# Patient Record
Sex: MALE | Race: WHITE | NOT HISPANIC OR LATINO | Employment: OTHER | URBAN - METROPOLITAN AREA
[De-identification: names, ages, dates, MRNs, and addresses within clinical notes are randomized per-mention and may not be internally consistent; named-entity substitution may affect disease eponyms.]

---

## 2017-10-03 ENCOUNTER — OFFICE VISIT (OUTPATIENT)
Dept: UROLOGY | Facility: CLINIC | Age: 37
End: 2017-10-03
Payer: COMMERCIAL

## 2017-10-03 ENCOUNTER — TELEPHONE (OUTPATIENT)
Dept: UROLOGY | Facility: CLINIC | Age: 37
End: 2017-10-03

## 2017-10-03 VITALS
TEMPERATURE: 98 F | HEIGHT: 72 IN | HEART RATE: 82 BPM | WEIGHT: 269 LBS | SYSTOLIC BLOOD PRESSURE: 122 MMHG | BODY MASS INDEX: 36.44 KG/M2 | DIASTOLIC BLOOD PRESSURE: 86 MMHG

## 2017-10-03 DIAGNOSIS — N20.0 KIDNEY STONES: Primary | ICD-10-CM

## 2017-10-03 DIAGNOSIS — N20.0 NEPHROLITHIASIS: Primary | ICD-10-CM

## 2017-10-03 DIAGNOSIS — R39.9 LOWER URINARY TRACT SYMPTOMS: ICD-10-CM

## 2017-10-03 DIAGNOSIS — R35.1 NOCTURIA: ICD-10-CM

## 2017-10-03 LAB
BACTERIA #/AREA URNS AUTO: ABNORMAL /HPF
BILIRUB UR QL STRIP: NEGATIVE
CLARITY UR REFRACT.AUTO: CLEAR
COLOR UR AUTO: YELLOW
GLUCOSE UR QL STRIP: NEGATIVE
HGB UR QL STRIP: ABNORMAL
KETONES UR QL STRIP: NEGATIVE
LEUKOCYTE ESTERASE UR QL STRIP: NEGATIVE
MICROSCOPIC COMMENT: ABNORMAL
NITRITE UR QL STRIP: NEGATIVE
PH UR STRIP: 5 [PH] (ref 5–8)
PROT UR QL STRIP: NEGATIVE
RBC #/AREA URNS AUTO: 5 /HPF (ref 0–4)
SP GR UR STRIP: 1.02 (ref 1–1.03)
URN SPEC COLLECT METH UR: ABNORMAL
UROBILINOGEN UR STRIP-ACNC: NEGATIVE EU/DL
WBC #/AREA URNS AUTO: 4 /HPF (ref 0–5)

## 2017-10-03 PROCEDURE — 99999 PR PBB SHADOW E&M-NEW PATIENT-LVL III: CPT | Mod: PBBFAC,,, | Performed by: UROLOGY

## 2017-10-03 PROCEDURE — 87086 URINE CULTURE/COLONY COUNT: CPT

## 2017-10-03 PROCEDURE — 81001 URINALYSIS AUTO W/SCOPE: CPT

## 2017-10-03 PROCEDURE — 99204 OFFICE O/P NEW MOD 45 MIN: CPT | Mod: S$GLB,,, | Performed by: UROLOGY

## 2017-10-03 NOTE — PROGRESS NOTES
Subjective:      Patient ID: Bassam Smith is a 37 y.o. male.    Chief Complaint: Nephrolithiasis (bilateral) and Other (prostate mass)    Patient is a 37 y.o. male who is new to our clinic and referred by their PCP, Dr. Sanchez for evaluation of kidney stones.     HPI    Urolithiasis  Patient complains of right abdominal pain without radiation to the groin. Onset of symptoms was abrupt starting 1 month ago with controlled course since that time. Patient describes the pain as aching and cramping, intermittent, occurring about every 48  Hours. The patient has had no nausea/no vomiting and no diaphoresis. There has been no fever or chills. The patient is not complaining of dysuria or frequency. Risk factors for urolithiasis: none.      Also complains of occasional nocturia 3-4x/night.  Slight urinary frequency but not severe.  aua ss 14/2.  No prostate meds.    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  Has an IVC filter. Denies blood thinners.    No results found for: PSA, PSADIAG, PSATOTAL, PSAFREE      Review of Systems   All other systems reviewed and negative except pertinent positives noted in HPI.    Objective:     Physical Exam   Nursing note and vitals reviewed.  Constitutional: He is oriented to person, place, and time. Vital signs are normal. He appears well-developed and well-nourished. He is cooperative.   HENT:   Head: Normocephalic.   Neck: No tracheal deviation present.   Cardiovascular: Normal rate and intact distal pulses.    Pulmonary/Chest: Effort normal. No accessory muscle usage. No tachypnea. No respiratory distress.   Abdominal: Soft. Normal appearance. He exhibits no distension, no fluid wave, no ascites and no mass. There is no tenderness. There is no rebound and no CVA tenderness. No hernia. Hernia confirmed negative in the right inguinal area and confirmed negative in the left inguinal area.       Liver/spleen non-palpable.   Genitourinary: Prostate normal, testes normal and penis normal.  Rectal exam shows no external hemorrhoid, no mass, no tenderness and anal tone normal. Prostate is not tender. Right testis shows no mass and no tenderness. Right testis is descended. Left testis shows no mass and no tenderness. Left testis is descended. Uncircumcised.   Genitourinary Comments: Scrotum showed no rashes or lesions.  Anus/perineum without lesion.  Epididymis showed no masses or tenderness. No meatal stenosis, meatus in normal location. No penile discharge/plaques. Prostate smooth, no nodules, 20 grams.  Seminal vesicles non-palpable.  Normal sphincter tone.        Musculoskeletal: Normal range of motion.   Lymphadenopathy: No inguinal adenopathy noted on the right or left side.        Right: No inguinal adenopathy present.        Left: No inguinal adenopathy present.   Neurological: He is alert and oriented to person, place, and time. He has normal strength.   Skin: No bruising and no rash noted.     Psychiatric: He has a normal mood and affect. His speech is normal and behavior is normal. Thought content normal.     Assessment:     1. Kidney stones    2. Lower urinary tract symptoms    3. Nocturia      Plan:     1. Kidney stones:  -General risk factors for kidney stones and the conservative measures to prevent kidney stones in the future were discussed with the patient in detail.  The patient was encouraged to drink 2-3 liters of water a day, limit iced tea and jasiel as well as foods high in oxalate.  They were cautioned to try to limit salt and red meat intake.  We also discussed adding citrate to the diet with the addition of alize or lemon juice to their water or alternatively with crystal light.   -CT scan from [facility] was independently reviewed today and reveals right midpole/renal pelvis stone, 11 mm, skin to stone distance 11 cm, Hounsfield units 403.  Right lower pole stone approximately 6 mm, Hounsfield units 688, skin to stone distance 11 cm.    -I have explained the  indication, risks, benefits, and alternatives of the procedure in detail.  Risks including but not limited to bleeding, infection, injury to the urethra, bladder, ureter, need for additional treatments, inability or incomplete removal of kidney stones, pain, and discomfort related to the stent were discussed in depth with the patient.  The patient voices understanding and all questions have been answered.  The patient agrees to proceed as planned with right ureteroscopy, laser lithotripsy, and ureteral stent placement.        2. LUTs:   -mild, patient satisfied      3. Nocturia:  -Limit fluids 2 hours before bedtime.  Elevated legs 2 hours before bedtime.  No caffeine, cokes, teas after 3 pm in the afternoon.

## 2017-10-03 NOTE — LETTER
October 3, 2017      [place]  [address] [address] [place] [address]           [facility]  [address]  [place] [address]  Phone: [phone]          Patient: Bassam Smith   MR Number: [id number]   YOB: 1980   Date of Visit: 10/3/2017       Dear Kristen Sanchez:    Thank you for referring Bassam Smith to me for evaluation. Attached you will find relevant portions of my assessment and plan of care.    If you have questions, please do not hesitate to call me. I look forward to following Bassam Smith along with you.    Sincerely,    Chase Sandoval MD    Enclosure  CC:  No Recipients    If you would like to receive this communication electronically, please contact [email or web address] or [phone] to request more information on [employer] access.    For providers and/or their staff who would like to refer a patient to [facility], please contact us through our one-stop-shop provider referral line, [facility], at [phone].    If you feel you have received this communication in error or would no longer like to receive these types of communications, please e-mail [email or web address]

## 2017-10-04 DIAGNOSIS — Z01.818 PREOP TESTING: Primary | ICD-10-CM

## 2017-10-04 LAB — BACTERIA UR CULT: NORMAL

## 2017-10-05 ENCOUNTER — TELEPHONE (OUTPATIENT)
Dept: UROLOGY | Facility: CLINIC | Age: 37
End: 2017-10-05

## 2020-08-29 ENCOUNTER — HOSPITAL ENCOUNTER (OUTPATIENT)
Facility: HOSPITAL | Age: 40
Discharge: HOME | End: 2020-08-30
Attending: SURGERY | Admitting: SURGERY
Payer: COMMERCIAL

## 2020-08-29 ENCOUNTER — HOSPITAL ENCOUNTER (EMERGENCY)
Facility: HOSPITAL | Age: 40
Discharge: HOME | End: 2020-08-29
Attending: EMERGENCY MEDICINE
Payer: COMMERCIAL

## 2020-08-29 VITALS
DIASTOLIC BLOOD PRESSURE: 75 MMHG | TEMPERATURE: 97 F | HEIGHT: 68 IN | WEIGHT: 268.31 LBS | HEART RATE: 76 BPM | BODY MASS INDEX: 40.66 KG/M2 | RESPIRATION RATE: 16 BRPM | SYSTOLIC BLOOD PRESSURE: 142 MMHG | OXYGEN SATURATION: 99 %

## 2020-08-29 DIAGNOSIS — S30.1XXA ABDOMINAL WALL HEMATOMA, INITIAL ENCOUNTER: Primary | ICD-10-CM

## 2020-08-29 DIAGNOSIS — R10.9 ABDOMINAL PAIN: ICD-10-CM

## 2020-08-29 LAB
ALBUMIN SERPL BCP-MCNC: 4.1 G/DL (ref 3.5–5.2)
ALP SERPL-CCNC: 78 U/L (ref 55–135)
ALT SERPL W/O P-5'-P-CCNC: 51 U/L (ref 10–44)
ANION GAP SERPL CALC-SCNC: 10 MMOL/L (ref 8–16)
AST SERPL-CCNC: 26 U/L (ref 10–40)
BASOPHILS # BLD AUTO: 0.02 K/UL (ref 0–0.2)
BASOPHILS NFR BLD: 0.4 % (ref 0–1.9)
BILIRUB SERPL-MCNC: 0.7 MG/DL (ref 0.1–1)
BILIRUB UR QL STRIP: NEGATIVE
BUN SERPL-MCNC: 12 MG/DL (ref 6–20)
CALCIUM SERPL-MCNC: 9.3 MG/DL (ref 8.7–10.5)
CHLORIDE SERPL-SCNC: 105 MMOL/L (ref 95–110)
CK MB SERPL-MCNC: 1.2 NG/ML (ref 0.1–6.5)
CK MB SERPL-RTO: 1.1 % (ref 0–5)
CK SERPL-CCNC: 114 U/L (ref 20–200)
CK SERPL-CCNC: 114 U/L (ref 20–200)
CLARITY UR: CLEAR
CO2 SERPL-SCNC: 26 MMOL/L (ref 23–29)
COLOR UR: YELLOW
CREAT SERPL-MCNC: 1.1 MG/DL (ref 0.5–1.4)
DIFFERENTIAL METHOD: ABNORMAL
EOSINOPHIL # BLD AUTO: 0.1 K/UL (ref 0–0.5)
EOSINOPHIL NFR BLD: 0.9 % (ref 0–8)
ERYTHROCYTE [DISTWIDTH] IN BLOOD BY AUTOMATED COUNT: 13.5 % (ref 11.5–14.5)
EST. GFR  (AFRICAN AMERICAN): >60 ML/MIN/1.73 M^2
EST. GFR  (NON AFRICAN AMERICAN): >60 ML/MIN/1.73 M^2
GLUCOSE SERPL-MCNC: 107 MG/DL (ref 70–110)
GLUCOSE UR QL STRIP: NEGATIVE
HCT VFR BLD AUTO: 50.7 % (ref 40–54)
HGB BLD-MCNC: 17 G/DL (ref 14–18)
HGB UR QL STRIP: ABNORMAL
IMM GRANULOCYTES # BLD AUTO: 0.02 K/UL (ref 0–0.04)
IMM GRANULOCYTES NFR BLD AUTO: 0.4 % (ref 0–0.5)
KETONES UR QL STRIP: NEGATIVE
LEUKOCYTE ESTERASE UR QL STRIP: NEGATIVE
LIPASE SERPL-CCNC: 18 U/L (ref 4–60)
LYMPHOCYTES # BLD AUTO: 1.1 K/UL (ref 1–4.8)
LYMPHOCYTES NFR BLD: 19.4 % (ref 18–48)
MCH RBC QN AUTO: 29.4 PG (ref 27–31)
MCHC RBC AUTO-ENTMCNC: 33.5 G/DL (ref 32–36)
MCV RBC AUTO: 88 FL (ref 82–98)
MONOCYTES # BLD AUTO: 0.4 K/UL (ref 0.3–1)
MONOCYTES NFR BLD: 7.4 % (ref 4–15)
NEUTROPHILS # BLD AUTO: 3.9 K/UL (ref 1.8–7.7)
NEUTROPHILS NFR BLD: 71.5 % (ref 38–73)
NITRITE UR QL STRIP: NEGATIVE
NRBC BLD-RTO: 0 /100 WBC
PH UR STRIP: 6 [PH] (ref 5–8)
PLATELET # BLD AUTO: 141 K/UL (ref 150–350)
PMV BLD AUTO: 9.8 FL (ref 9.2–12.9)
POTASSIUM SERPL-SCNC: 4.1 MMOL/L (ref 3.5–5.1)
PROT SERPL-MCNC: 7.2 G/DL (ref 6–8.4)
PROT UR QL STRIP: NEGATIVE
RBC # BLD AUTO: 5.79 M/UL (ref 4.6–6.2)
SARS-COV-2 RDRP RESP QL NAA+PROBE: NEGATIVE
SODIUM SERPL-SCNC: 141 MMOL/L (ref 136–145)
SP GR UR STRIP: 1.02 (ref 1–1.03)
TROPONIN I SERPL DL<=0.01 NG/ML-MCNC: <0.006 NG/ML (ref 0–0.03)
URN SPEC COLLECT METH UR: ABNORMAL
UROBILINOGEN UR STRIP-ACNC: NEGATIVE EU/DL
WBC # BLD AUTO: 5.42 K/UL (ref 3.9–12.7)

## 2020-08-29 PROCEDURE — 81003 URINALYSIS AUTO W/O SCOPE: CPT

## 2020-08-29 PROCEDURE — 93010 ELECTROCARDIOGRAM REPORT: CPT | Mod: ,,, | Performed by: INTERNAL MEDICINE

## 2020-08-29 PROCEDURE — G0378 HOSPITAL OBSERVATION PER HR: HCPCS

## 2020-08-29 PROCEDURE — 25500020 PHARM REV CODE 255: Performed by: SURGERY

## 2020-08-29 PROCEDURE — 99285 EMERGENCY DEPT VISIT HI MDM: CPT | Mod: 25,27

## 2020-08-29 PROCEDURE — 25000003 PHARM REV CODE 250: Performed by: SURGERY

## 2020-08-29 PROCEDURE — 93010 EKG 12-LEAD: ICD-10-PCS | Mod: ,,, | Performed by: INTERNAL MEDICINE

## 2020-08-29 PROCEDURE — 84484 ASSAY OF TROPONIN QUANT: CPT

## 2020-08-29 PROCEDURE — 80053 COMPREHEN METABOLIC PANEL: CPT

## 2020-08-29 PROCEDURE — 82553 CREATINE MB FRACTION: CPT

## 2020-08-29 PROCEDURE — 82550 ASSAY OF CK (CPK): CPT

## 2020-08-29 PROCEDURE — 85025 COMPLETE CBC W/AUTO DIFF WBC: CPT

## 2020-08-29 PROCEDURE — U0002 COVID-19 LAB TEST NON-CDC: HCPCS

## 2020-08-29 PROCEDURE — 93005 ELECTROCARDIOGRAM TRACING: CPT

## 2020-08-29 PROCEDURE — 99284 EMERGENCY DEPT VISIT MOD MDM: CPT | Mod: 25

## 2020-08-29 PROCEDURE — 83690 ASSAY OF LIPASE: CPT

## 2020-08-29 RX ORDER — ONDANSETRON 2 MG/ML
4 INJECTION INTRAMUSCULAR; INTRAVENOUS
Status: DISCONTINUED | OUTPATIENT
Start: 2020-08-29 | End: 2020-08-29

## 2020-08-29 RX ORDER — ONDANSETRON 2 MG/ML
4 INJECTION INTRAMUSCULAR; INTRAVENOUS
Status: ACTIVE | OUTPATIENT
Start: 2020-08-29 | End: 2020-08-30

## 2020-08-29 RX ORDER — HYDROCODONE BITARTRATE AND ACETAMINOPHEN 5; 325 MG/1; MG/1
1 TABLET ORAL EVERY 4 HOURS PRN
Status: DISCONTINUED | OUTPATIENT
Start: 2020-08-29 | End: 2020-08-30 | Stop reason: HOSPADM

## 2020-08-29 RX ORDER — ONDANSETRON 2 MG/ML
4 INJECTION INTRAMUSCULAR; INTRAVENOUS EVERY 8 HOURS PRN
Status: DISCONTINUED | OUTPATIENT
Start: 2020-08-29 | End: 2020-08-30 | Stop reason: HOSPADM

## 2020-08-29 RX ORDER — MORPHINE SULFATE 2 MG/ML
2 INJECTION, SOLUTION INTRAMUSCULAR; INTRAVENOUS EVERY 4 HOURS PRN
Status: DISCONTINUED | OUTPATIENT
Start: 2020-08-29 | End: 2020-08-30 | Stop reason: HOSPADM

## 2020-08-29 RX ORDER — ACETAMINOPHEN 325 MG/1
650 TABLET ORAL EVERY 8 HOURS PRN
Status: DISCONTINUED | OUTPATIENT
Start: 2020-08-29 | End: 2020-08-30 | Stop reason: HOSPADM

## 2020-08-29 RX ORDER — SODIUM CHLORIDE 9 MG/ML
INJECTION, SOLUTION INTRAVENOUS CONTINUOUS
Status: DISCONTINUED | OUTPATIENT
Start: 2020-08-29 | End: 2020-08-30 | Stop reason: HOSPADM

## 2020-08-29 RX ORDER — SODIUM CHLORIDE 0.9 % (FLUSH) 0.9 %
10 SYRINGE (ML) INJECTION
Status: DISCONTINUED | OUTPATIENT
Start: 2020-08-29 | End: 2020-08-30 | Stop reason: HOSPADM

## 2020-08-29 RX ORDER — MORPHINE SULFATE 2 MG/ML
2 INJECTION, SOLUTION INTRAMUSCULAR; INTRAVENOUS
Status: ACTIVE | OUTPATIENT
Start: 2020-08-29 | End: 2020-08-30

## 2020-08-29 RX ORDER — TRAMADOL HYDROCHLORIDE 50 MG/1
50 TABLET ORAL EVERY 6 HOURS PRN
Qty: 12 TABLET | Refills: 0 | Status: SHIPPED | OUTPATIENT
Start: 2020-08-29 | End: 2021-10-06

## 2020-08-29 RX ORDER — MORPHINE SULFATE 2 MG/ML
1 INJECTION, SOLUTION INTRAMUSCULAR; INTRAVENOUS
Status: DISCONTINUED | OUTPATIENT
Start: 2020-08-29 | End: 2020-08-29

## 2020-08-29 RX ADMIN — SODIUM CHLORIDE 500 ML: 0.9 INJECTION, SOLUTION INTRAVENOUS at 09:08

## 2020-08-29 RX ADMIN — SODIUM CHLORIDE: 0.9 INJECTION, SOLUTION INTRAVENOUS at 10:08

## 2020-08-29 RX ADMIN — SODIUM CHLORIDE 500 ML: 0.9 INJECTION, SOLUTION INTRAVENOUS at 12:08

## 2020-08-29 RX ADMIN — IOHEXOL 100 ML: 350 INJECTION, SOLUTION INTRAVENOUS at 01:08

## 2020-08-29 NOTE — ED TRIAGE NOTES
40 y.o. male presents to ER ED 04/ED 04   Chief Complaint   Patient presents with    Fall     hit himself with the arm of a  the groin and is hurting from the groin to the lower abdomen, pt states he fell off of a  2 foot stand 1 hour ago, he denies LOC   . No acute distress noted.

## 2020-08-29 NOTE — ED PROVIDER NOTES
Encounter Date: 8/29/2020       History     Chief Complaint   Patient presents with    Fall     hit himself with the arm of a  the groin and is hurting from the groin to the lower abdomen, pt states he fell off of a  2 foot stand 1 hour ago, he denies LOC     Bassam Smith is a 40 y.o. male with PMH of nephrolithiasis who presents to the ED for evaluation of lower abdominal/groin pain after accidentally hitting himself with the arm of a  the lower abdomen/ groin and causing him to fall approximately 2 ft.   He presents with pain to lower abdomen, mons pubis with associated bruising and swelling.  He denies difficulty urinating or hematuria.   Denies Loc with fall; he denies head, neck or back pain.   Denies dizziness, light-headedness, nausea or vomiting.     The history is provided by the patient.     Review of patient's allergies indicates:  No Known Allergies  Past Medical History:   Diagnosis Date    Nephrolithiasis      Past Surgical History:   Procedure Laterality Date    CHOLECYSTECTOMY       History reviewed. No pertinent family history.  Social History     Tobacco Use    Smoking status: Never Smoker   Substance Use Topics    Alcohol use: No    Drug use: Not on file     Review of Systems   Constitutional: Negative.  Negative for appetite change, chills and fever.   HENT: Negative.  Negative for congestion, ear discharge, ear pain, postnasal drip, rhinorrhea and sore throat.    Eyes: Negative.    Respiratory: Negative.  Negative for cough, chest tightness and shortness of breath.    Cardiovascular: Negative.  Negative for chest pain.   Gastrointestinal: Positive for abdominal pain. Negative for abdominal distention and nausea.   Endocrine: Negative.    Genitourinary: Negative.  Negative for dysuria, flank pain, hematuria and urgency.   Musculoskeletal: Negative.  Negative for arthralgias and back pain.   Skin: Negative.  Negative for rash.   Allergic/Immunologic: Negative.     Neurological: Negative.  Negative for dizziness, weakness, numbness and headaches.   Hematological: Negative.  Does not bruise/bleed easily.   Psychiatric/Behavioral: Negative.        Physical Exam     Initial Vitals [08/29/20 1151]   BP Pulse Resp Temp SpO2   (!) 157/99 83 20 97 °F (36.1 °C) 99 %      MAP       --         Physical Exam    Nursing note and vitals reviewed.  Constitutional: He appears well-developed and well-nourished.   HENT:   Head: Normocephalic and atraumatic.   Right Ear: External ear normal.   Left Ear: External ear normal.   Nose: Nose normal.   Mouth/Throat: Oropharynx is clear and moist. No oropharyngeal exudate.   Eyes: Conjunctivae and EOM are normal. Pupils are equal, round, and reactive to light.   Neck: Normal range of motion. Neck supple.   Cardiovascular: Normal rate, regular rhythm, normal heart sounds and intact distal pulses.   Pulmonary/Chest: Breath sounds normal.   Abdominal: Soft. Bowel sounds are normal. There is abdominal tenderness in the suprapubic area. There is no rigidity, no rebound, no guarding, no CVA tenderness, no tenderness at McBurney's point and negative Carolina's sign. No hernia.   Musculoskeletal: Normal range of motion.   Neurological: He is alert and oriented to person, place, and time. He has normal strength.   Skin: Skin is warm and dry. Bruising (mons pubis) and ecchymosis noted.   Psychiatric: He has a normal mood and affect. His behavior is normal. Judgment and thought content normal.         ED Course   Procedures  Labs Reviewed   URINALYSIS - Abnormal; Notable for the following components:       Result Value    Occult Blood UA Trace (*)     All other components within normal limits   COMPREHENSIVE METABOLIC PANEL - Abnormal; Notable for the following components:    ALT 51 (*)     All other components within normal limits   CBC W/ AUTO DIFFERENTIAL - Abnormal; Notable for the following components:    Platelets 141 (*)     All other components within  normal limits   TROPONIN I   CK   CK-MB   LIPASE          Imaging Results          CT Abdomen Pelvis With Contrast (Final result)  Result time 08/29/20 14:02:49    Final result by Bassam Shaikh MD (08/29/20 14:02:49)                 Impression:      1. No evidence of acute intra-abdominal/intrapelvic traumatic injury.  2. Mild posttraumatic edema and subcutaneous fat stranding/hemorrhage involving the lower ventral abdominal and pelvic wall.  Note is made of a thin hematoma in the region of the right inferior epigastric artery.  Continued clinical surveillance for hematoma enlargement recommended.  3. Enhancing 3.2 cm splenic mass, slightly enlarged as compared to the prior reference CT on 09/06/2017, which is indeterminate and may represent a hemangioma.  Further characterization with follow-up nonemergent MRI of the liver recommended.  4. Bilateral nonobstructive nephrolithiasis with a nonobstructive, 2 mm stone within the distal left ureter.  No hydronephrosis.  5. Hepatic steatosis.  6. Additional findings, as above.  Yellow Splenic/Azra 2013 Alert:    Yellow Actionable Finding Splenic or Azra on CT or MRI (J Am Erin Radiol 2013;10:833-839)    UNEXPECTED FINDINGS:  Incidental Splenic Mass - Non Diagnostic Features Not Stable (2, 3 or 4)    RECOMMENDATIONS:  MRI Liver/Spleen with and without contrast      Electronically signed by: Bassam Shaikh  Date:    08/29/2020  Time:    14:02             Narrative:    EXAMINATION:  CT ABDOMEN PELVIS WITH CONTRAST    CLINICAL HISTORY:  Abdominal trauma, blunt;Abdominal distension;    TECHNIQUE:  Low dose axial images, sagittal and coronal reformations were obtained from the lung bases to the pubic symphysis following the IV administration of 100 mL of Omnipaque 350 .  Oral contrast was not administered.    COMPARISON:  Reference CT abdomen and pelvis with contrast 09/06/2017, which is slightly CT abdomen pelvis 09/21/2014    FINDINGS:  Abdomen:    - Lower thorax:Heart is  normal in size.  No pericardial effusion.    - Lung bases: No infiltrates and no nodules.    - Liver: Liver is normal in size and contour.  Hepatic parenchyma is diffusely decreased in attenuation in keeping with hepatic steatosis.  No focal hepatic mass.    - Gallbladder: Gallbladder surgically absent.    - Bile Ducts: No evidence of intra or extra hepatic biliary ductal dilation.    - Stomach: No significant abnormalities.    - Spleen: There is a heterogeneous enhancing mass within the spleen, measuring 2.9 x 3.2 cm in AP by TV dimensions, slightly increased in size as compared to the prior reference CT on 09/06/2017.  Moderate enlargement as compared to the prior CT dated 09/21/2014.    - Kidneys: Kidneys are normal in size and enhance appropriately. No enhancing mass or hydronephrosis. Bilateral nonobstructive nephrolithiasis.  There is a 2 mm nonobstructive stone within the distal left ureter.  No hydronephrosis.    - Adrenals: Adrenal glands appear within normal limits.    - Pancreas: No mass or peripancreatic fat stranding.    - Retroperitoneum:  No significant adenopathy.    - Vascular: No abdominal aortic aneurysm.  IVC filter in place.    - Abdominal wall:  Soft tissue swelling and subcutaneous fat stranding of the lower ventral abdominal and pelvic wall, likely posttraumatic.  Note is made of a thin hyperdense collection adjacent to the right inferior epigastric artery, measuring approximately 0.4 x 1.3 cm, suggestive of a small hematoma.    Pelvis:    No pelvic mass, adenopathy, or free fluid.    Bowel/Mesentery:    Diverticulosis coli without evidence of diverticulitis.  No evidence of bowel obstruction or inflammation.  No ascites or pneumoperitoneum.  The appendix appears normal.    Bones: Age-appropriate degenerative changes.  Bilateral L5 pars defects with minimal grade 1 anterolisthesis of L5 on S1.  No acute osseous abnormality and no suspicious lytic or blastic lesion.                                   Medications   sodium chloride 0.9% bolus 500 mL (0 mLs Intravenous Stopped 8/29/20 1308)   iohexoL (OMNIPAQUE 350) injection 100 mL (100 mLs Intravenous Given 8/29/20 1323)     Medical Decision Making:   Clinical Tests:   Lab Tests: Ordered and Reviewed  Radiological Study: Ordered and Reviewed  ED Management:  Ice pack applied to lower abdomen/mons pubis  Lab gomez unremarkable.  CT abdomen: 1. No evidence of acute intra-abdominal/intrapelvic traumatic injury.  2. Mild posttraumatic edema and subcutaneous fat stranding/hemorrhage involving the lower ventral abdominal and pelvic wall.  Note is made of a thin hematoma in the region of the right inferior epigastric artery.  Continued clinical surveillance for hematoma enlargement recommended.  3. Enhancing 3.2 cm splenic mass, slightly enlarged as compared to the prior reference CT on 09/06/2017, which is indeterminate and may represent a hemangioma.  Further characterization with follow-up nonemergent MRI of the liver recommended.  4. Bilateral nonobstructive nephrolithiasis with a nonobstructive, 2 mm stone within the distal left ureter.  No hydronephrosis.  5. Hepatic steatosis.  6. Additional findings, as above.  Yellow Splenic/Azra 2013 Alert:     Yellow Actionable Finding Splenic or Azra on CT or MRI (J Am Erin Radiol 2013;10:833-839)     UNEXPECTED FINDINGS:  Incidental Splenic Mass - Non Diagnostic Features Not Stable (2, 3 or 4)     RECOMMENDATIONS:  MRI Liver/Spleen with and without contrast        Ct reviewed with collaborating provider, Dr. Shay.   Ice pack for comfort; ultram for pain; close follow-up with medical provider with specific return precautions discussed with voiced understanding. Patient will follow-up with KAVYA Cardoso.                                        Clinical Impression:       ICD-10-CM ICD-9-CM   1. Abdominal wall hematoma, initial encounter  S30.1XXA 922.2   2. Abdominal pain  R10.9 789.00             ED Disposition  Condition    Discharge Stable        ED Prescriptions     Medication Sig Dispense Start Date End Date Auth. Provider    traMADoL (ULTRAM) 50 mg tablet Take 1 tablet (50 mg total) by mouth every 6 (six) hours as needed for Pain. 12 tablet 8/29/2020  Dorothy Mckeon NP        Follow-up Information     Follow up With Specialties Details Why Contact Info    Kristen Sanchez NP Family Medicine Go in 2 days  [id number]   [place] [address] [phone]                      The patient acknowledges that close follow up with medical provider is required. Instructed to follow up with PCP within 2 days. Patient was given specific return precautions. The patient agrees to comply with all instruction and directions given in the ER.                  Dorotyh Mckeon NP  08/29/20 4169

## 2020-08-30 VITALS
BODY MASS INDEX: 36.43 KG/M2 | RESPIRATION RATE: 18 BRPM | OXYGEN SATURATION: 95 % | DIASTOLIC BLOOD PRESSURE: 73 MMHG | WEIGHT: 268.94 LBS | TEMPERATURE: 98 F | HEIGHT: 72 IN | SYSTOLIC BLOOD PRESSURE: 118 MMHG | HEART RATE: 78 BPM

## 2020-08-30 LAB
ALBUMIN SERPL BCP-MCNC: 3.7 G/DL (ref 3.5–5.2)
ALP SERPL-CCNC: 68 U/L (ref 55–135)
ALT SERPL W/O P-5'-P-CCNC: 43 U/L (ref 10–44)
ANION GAP SERPL CALC-SCNC: 12 MMOL/L (ref 8–16)
AST SERPL-CCNC: 22 U/L (ref 10–40)
BASOPHILS # BLD AUTO: 0.03 K/UL (ref 0–0.2)
BASOPHILS NFR BLD: 0.5 % (ref 0–1.9)
BILIRUB SERPL-MCNC: 0.5 MG/DL (ref 0.1–1)
BUN SERPL-MCNC: 10 MG/DL (ref 6–20)
CALCIUM SERPL-MCNC: 8.7 MG/DL (ref 8.7–10.5)
CHLORIDE SERPL-SCNC: 105 MMOL/L (ref 95–110)
CO2 SERPL-SCNC: 25 MMOL/L (ref 23–29)
CREAT SERPL-MCNC: 0.9 MG/DL (ref 0.5–1.4)
DIFFERENTIAL METHOD: ABNORMAL
EOSINOPHIL # BLD AUTO: 0.1 K/UL (ref 0–0.5)
EOSINOPHIL NFR BLD: 1.1 % (ref 0–8)
ERYTHROCYTE [DISTWIDTH] IN BLOOD BY AUTOMATED COUNT: 13.5 % (ref 11.5–14.5)
EST. GFR  (AFRICAN AMERICAN): >60 ML/MIN/1.73 M^2
EST. GFR  (NON AFRICAN AMERICAN): >60 ML/MIN/1.73 M^2
GLUCOSE SERPL-MCNC: 97 MG/DL (ref 70–110)
HCT VFR BLD AUTO: 47.2 % (ref 40–54)
HGB BLD-MCNC: 15.6 G/DL (ref 14–18)
IMM GRANULOCYTES # BLD AUTO: 0.02 K/UL (ref 0–0.04)
IMM GRANULOCYTES NFR BLD AUTO: 0.4 % (ref 0–0.5)
LYMPHOCYTES # BLD AUTO: 1.4 K/UL (ref 1–4.8)
LYMPHOCYTES NFR BLD: 25.1 % (ref 18–48)
MCH RBC QN AUTO: 29.4 PG (ref 27–31)
MCHC RBC AUTO-ENTMCNC: 33.1 G/DL (ref 32–36)
MCV RBC AUTO: 89 FL (ref 82–98)
MONOCYTES # BLD AUTO: 0.5 K/UL (ref 0.3–1)
MONOCYTES NFR BLD: 8.7 % (ref 4–15)
NEUTROPHILS # BLD AUTO: 3.5 K/UL (ref 1.8–7.7)
NEUTROPHILS NFR BLD: 64.2 % (ref 38–73)
NRBC BLD-RTO: 0 /100 WBC
PLATELET # BLD AUTO: 129 K/UL (ref 150–350)
PMV BLD AUTO: 10.1 FL (ref 9.2–12.9)
POTASSIUM SERPL-SCNC: 4.1 MMOL/L (ref 3.5–5.1)
PROT SERPL-MCNC: 6.4 G/DL (ref 6–8.4)
RBC # BLD AUTO: 5.3 M/UL (ref 4.6–6.2)
SODIUM SERPL-SCNC: 142 MMOL/L (ref 136–145)
WBC # BLD AUTO: 5.5 K/UL (ref 3.9–12.7)

## 2020-08-30 PROCEDURE — 99284 EMERGENCY DEPT VISIT MOD MDM: CPT | Mod: ,,, | Performed by: SURGERY

## 2020-08-30 PROCEDURE — 80053 COMPREHEN METABOLIC PANEL: CPT

## 2020-08-30 PROCEDURE — 36415 COLL VENOUS BLD VENIPUNCTURE: CPT

## 2020-08-30 PROCEDURE — 85025 COMPLETE CBC W/AUTO DIFF WBC: CPT

## 2020-08-30 PROCEDURE — 99284 PR EMERGENCY DEPT VISIT,LEVEL IV: ICD-10-PCS | Mod: ,,, | Performed by: SURGERY

## 2020-08-30 PROCEDURE — G0378 HOSPITAL OBSERVATION PER HR: HCPCS

## 2020-08-30 RX ORDER — ACETAMINOPHEN 325 MG/1
650 TABLET ORAL EVERY 6 HOURS PRN
Status: DISCONTINUED | OUTPATIENT
Start: 2020-08-30 | End: 2020-08-30 | Stop reason: HOSPADM

## 2020-08-30 RX ORDER — SODIUM CHLORIDE 9 MG/ML
INJECTION, SOLUTION INTRAVENOUS CONTINUOUS
Status: DISCONTINUED | OUTPATIENT
Start: 2020-08-30 | End: 2020-08-30 | Stop reason: HOSPADM

## 2020-08-30 NOTE — PLAN OF CARE
08/30/20 1320   Final Note   Assessment Type Final Discharge Note   Anticipated Discharge Disposition Home   What phone number can be called within the next 1-3 days to see how you are doing after discharge? [phone]   Hospital Follow Up  Appt(s) scheduled? Yes   Discharge plans and expectations educations in teach back method with documentation complete? Yes   Right Care Referral Info   Post Acute Recommendation No Care   Post-Acute Status   Post-Acute Authorization Other   Other Status No Post-Acute Service Needs   Discharge Delays None known at this time

## 2020-08-30 NOTE — ED PROVIDER NOTES
[facility]                                                 Chief Complaint  40 y.o. male with Abdominal Pain    History of Present Illness  Bassam Smith presents to the emergency room with abdominal wall hematoma  Patient was seen in the ER earlier today with a lower abdominal hematoma  Noted that time to have a hematoma involving the right inferior epigastric artery  Patient states he went home with minimal pain at the time of ER discharge today  Patient returns tonight with increased pain in the right lower abdominal wall area  He states now the hematoma involves the top of his glans penis, stable vitals    The history is provided by the patient   device was not used during this ER visit  Medical history: Nephrolithiasis  Surgeries: Gallbladder  No known allergies    I have reviewed all of this patient's past medical, surgical, family, and social   histories as well as active allergies and medications documented in the  electronic medical record    Review of Systems and Physical Exam      Review of Systems  -- Constitution - no fever, denies fatigue, no weakness, no chills  -- Eyes - no tearing or redness, no visual disturbance  -- Ear, Nose - no tinnitus or earache, no nasal congestion or discharge  -- Mouth,Throat - no sore throat, no toothache, normal voice, normal swallowing  -- Respiratory - denies cough and congestion, no shortness of breath, no MITCHELL  -- Cardiovascular - denies chest pain, no palpitations, denies claudication  -- Gastrointestinal - lower abdominal pain, no nausea, vomiting, or diarrhea  -- Genitourinary - no dysuria, denies flank pain, no hematuria, no STD risk  -- Musculoskeletal - denies back pain, negative for trauma or injury  -- Neurological - no headache, denies weakness or seizure; no LOC  -- Skin - denies pallor, rash, or changes in skin. no hives or welts noted  -- Psychiatric - Denies SI or HI, no psychosis or fractured thought noted      Physical Exam  -- Nursing note and vitals reviewed  -- Constitutional: Appears well-developed and well-nourished  -- Head: Atraumatic. Normocephalic. No obvious abnormality  -- Eyes: Pupils are equal and reactive to light. Normal conjunctiva and lids  -- Cardiac: Normal rate, regular rhythm and normal heart sounds  -- Pulmonary: Normal respiratory effort, breath sounds clear to auscultation  -- Abdominal: Soft, no tenderness. Normal bowel sounds. Normal liver edge  -- Musculoskeletal: Normal range of motion, no effusions. Joints stable   -- Neurological: No focal deficits. Showed good interaction with staff  -- Vascular: Posterior tibial, dorsalis pedis and radial pulses 2+ bilaterally    -- Lymphatics: No cervical or peripheral lymphadenopathy. No edema noted  -- Skin: lower abdominal wall hematoma with pain on exam    Emergency Room Course      Lab Results     K 4.1      CO2 26   BUN 12   CREATININE 1.1      ALKPHOS 78   AST 26   ALT 51 (H)   BILITOT 0.7   ALBUMIN 4.1   PROT 7.2   WBC 5.42   HGB 17.0   HCT 50.7    (L)         CPKMB 1.2   TROPONINI <0.006     Urinalysis  -- Urinalysis performed during this ER visit showed no signs of infection      EKG  -- The EKG findings today were without concerning findings from baseline     Radiology  No evidence of acute intra-abdominal/intrapelvic traumatic injury.  Mild posttraumatic edema and subcutaneous fat stranding/hemorrhage   involving the lower ventral abdominal and pelvic wall.    Note is made of a thin hematoma in the region of the right inferior epigastric artery.      Additional Work up  -- rapid Coronavirus PCR was negative    Medications Given  morphine injection 2 mg (has no administration in time range)   ondansetron injection 4 mg (has no administration in time range)   sodium chloride 0.9% bolus 500 mL (has no administration in time range)     ED Physician Management  -- Diagnosis management comments: 40 y.o.  male with abdominal wall hematoma  -- patient has a hematoma with right inferior epigastric artery found earlier today  -- patient returns tonight with increased abdominal pain, worsening hematoma  -- will be observed overnight under the care of General surgery for pain control    Diagnosis  [S30.1XXA] Abdominal wall hematoma, initial encounter (Primary)    Disposition and Plan  -- Disposition: observation  -- Condition: stable    This note is dictated on M*Modal word recognition program.  There are word recognition mistakes that are occasionally missed on review.              Bassam Shay MD  08/30/20 0980

## 2020-08-30 NOTE — NURSING
Patient awake, alert, oriented. Room air. Telemetry monitor removed. Peripheral IV removed. Catheter tip intact. Patient denies pain. Patient urinating without difficulty. VSS. AVS given and explained. Dr. Shay at bedside to educate patient. Verbalized understanding.

## 2020-08-30 NOTE — PLAN OF CARE
Problem: Adult Inpatient Plan of Care  Goal: Plan of Care Review  Outcome: Ongoing, Progressing     Problem: Pain Acute  Goal: Optimal Pain Control  Outcome: Ongoing, Progressing     Problem: Bariatric Environmental Safety  Goal: Safety Maintained with Care  Outcome: Ongoing, Progressing

## 2020-08-30 NOTE — NURSING TRANSFER
Nursing Transfer Note      8/29/2020    Transfer To: 307 from ED    Transfer via stretcher    Transfer with cardiac monitoring    Transported by nurse    Medicines sent: none    Chart send with patient: Yes    Notified: daughter    Patient reassessed at: 08/29/2020 @ 2220    Upon arrival to floor: cardiac monitor applied, patient oriented to room, call bell in reach and bed in lowest position. Ice applied to scrotal area.

## 2020-08-30 NOTE — H&P
[facility]  History & Physical    Subjective:      Chief Complaint/Reason for Admission:   Abdominal wall hematoma    Bassam Smith is a 40 y.o. male who presented to the emergency room after a fall.  Patient states he fell about 2 or 3 ft off of a stand and landed on a metal bar across his lower pelvis.  He presented to the emergency room and underwent evaluation after the incident.  Patient was complaining of a lot of suprapubic pain and noticed to have some ecchymosis and hematoma.  CT scan was unremarkable other than for a soft tissue hematoma.  Patient was discharged home and presented back to the emergency room due to increased pain and swelling.  Patient denies any abdominal pain.  He denies any nausea or vomiting.  He denies any dysuria.  He denies any hematuria.  Patient states he had no difficulty urinating and urine was clear.    Past Medical History:   Diagnosis Date    Nephrolithiasis      Past Surgical History:   Procedure Laterality Date    APPENDECTOMY      CHOLECYSTECTOMY       History reviewed. No pertinent family history.  Social History     Tobacco Use    Smoking status: Never Smoker   Substance Use Topics    Alcohol use: No    Drug use: Not on file       PTA Medications   Medication Sig    traMADoL (ULTRAM) 50 mg tablet Take 1 tablet (50 mg total) by mouth every 6 (six) hours as needed for Pain.     Review of patient's allergies indicates:  No Known Allergies     ROS  Denies headaches denies loss of consciousness denies dizziness  Denies shortness of breath  Denies chest pain  Denies abdominal pain  Denies hematuria  Objective:      Vital Signs (Most Recent)  Temp: 98.4 °F (36.9 °C) (08/30/20 0711)  Pulse: 75 (08/30/20 0711)  Resp: 18 (08/30/20 0711)  BP: 116/78 (08/30/20 0711)  SpO2: 98 % (08/30/20 0711)    Vital Signs Range (Last 24H):  Temp:  [96.9 °F (36.1 °C)-98.4 °F (36.9 °C)]   Pulse:  [69-83]   Resp:  [16-20]   BP: (116-157)/(65-99)   SpO2:  [95 %-99 %]      Physical Exam  But developed well nourished male sitting up in bed he is in no acute distress  Head neck exam is unremarkable  No C-spine tenderness  Chest is clear with equal breath breath sounds bilaterally  No crepitance no tenderness across the dressed  Abdomen is soft there is no distention or tenderness patient has some ecchymosis in the lower abdomen just above the pubic symphysis there is no focal hematoma there is minimal tenderness in this area  Examination of the genitalia reveals some swelling and ecchymosis of the penis  No lower extremity deformities  Data Review:  Radiology review:  All as reviewed   ECG: ..     Assessment:      Active Hospital Problems    Diagnosis  POA    Abdominal wall hematoma [S30.1XXA]  Yes      Resolved Hospital Problems   No resolved problems to display.       Plan:    Patient was admitted for observation.  No indication for surgical intervention. Patient reassured.  If patient remains stable will be discharged.

## 2020-08-30 NOTE — NURSING
Report given to oncoming nurse at bedside. NAD noted. Safety precautions maintained. Call bell in reach. NPO status maintained.  Chart check completed.

## 2020-09-14 NOTE — DISCHARGE SUMMARY
[facility]  Discharge Note  Short Stay    * No surgery found *    OUTCOME: Patient tolerated treatment/procedure well without complication and is now ready for discharge.    DISPOSITION: Home or Self Care    FINAL DIAGNOSIS:  Abdominal wall hematoma    FOLLOWUP: In clinic    DISCHARGE INSTRUCTIONS:    Discharge Procedure Orders   Diet general     Call MD for:  temperature >100.4     Call MD for:  persistent nausea and vomiting     Call MD for:  severe uncontrolled pain     Call MD for:  difficulty breathing, headache or visual disturbances     Call MD for:  redness, tenderness, or signs of infection (pain, swelling, redness, odor or green/yellow discharge around incision site)     Shower on day dressing removed (No bath)        Clinical Reference Documents Added to Patient Instructions       Document    SOFT TISSUE CONTUSION (ENGLISH)

## 2022-11-23 PROBLEM — M62.89 MYOTONIA: Status: ACTIVE | Noted: 2022-11-23

## 2022-11-23 PROBLEM — I48.0 PAF (PAROXYSMAL ATRIAL FIBRILLATION): Status: ACTIVE | Noted: 2022-11-23

## 2024-06-26 ENCOUNTER — OFFICE VISIT (OUTPATIENT)
Dept: UROLOGY | Facility: CLINIC | Age: 44
End: 2024-06-26
Payer: COMMERCIAL

## 2024-06-26 VITALS
HEIGHT: 72 IN | WEIGHT: 249.69 LBS | DIASTOLIC BLOOD PRESSURE: 87 MMHG | SYSTOLIC BLOOD PRESSURE: 126 MMHG | BODY MASS INDEX: 33.82 KG/M2 | HEART RATE: 94 BPM

## 2024-06-26 DIAGNOSIS — N48.9 PENILE LESION: Primary | ICD-10-CM

## 2024-06-26 PROCEDURE — 3079F DIAST BP 80-89 MM HG: CPT | Mod: CPTII,S$GLB,,

## 2024-06-26 PROCEDURE — 3074F SYST BP LT 130 MM HG: CPT | Mod: CPTII,S$GLB,,

## 2024-06-26 PROCEDURE — 1159F MED LIST DOCD IN RCRD: CPT | Mod: CPTII,S$GLB,,

## 2024-06-26 PROCEDURE — 1160F RVW MEDS BY RX/DR IN RCRD: CPT | Mod: CPTII,S$GLB,,

## 2024-06-26 PROCEDURE — 99999 PR PBB SHADOW E&M-EST. PATIENT-LVL III: CPT | Mod: PBBFAC,,,

## 2024-06-26 PROCEDURE — 99202 OFFICE O/P NEW SF 15 MIN: CPT | Mod: S$GLB,,,

## 2024-06-26 PROCEDURE — 3008F BODY MASS INDEX DOCD: CPT | Mod: CPTII,S$GLB,,

## 2024-06-26 NOTE — PROGRESS NOTES
Subjective:       Patient ID: Bassam Smith is a 44 y.o. male.    Chief Complaint: penile skin tags     This is a 44 y.o.  male patient that is new to me.  The patient was self referred  for penile skin tags. Patient reports penile skin tags that have been present about 4 months. Denies penile discharge, pruritus, bleeding to skin tag areas. Denies LUTS, dysuria, hematuria, fevers, chills.  Reports Burning at glans penis that is intermittent.   Rubbing skin tags causes some discomfort.   Reports that his girlfriend of 3 years had a really bad yeast infection about 4 months ago as well, she has been treated for this.       Lab Results   Component Value Date    CREATININE 1.00 11/23/2022       ---  PMH/PSH/Medications/Allergies/Social history reviewed and as in chart.    Review of Systems   Constitutional:  Negative for activity change, chills and fever.   Respiratory:  Negative for shortness of breath.    Cardiovascular:  Negative for chest pain and palpitations.   Gastrointestinal:  Negative for abdominal pain and constipation.   Genitourinary:  Negative for difficulty urinating, dysuria, flank pain, frequency, hematuria, penile discharge, penile pain, penile swelling, scrotal swelling, testicular pain and urgency.        Skin tags   Neurological:  Negative for dizziness and light-headedness.       Objective:      Physical Exam  HENT:      Head: Normocephalic.   Pulmonary:      Effort: Pulmonary effort is normal.   Abdominal:      General: Abdomen is flat.      Palpations: Abdomen is soft.   Genitourinary:     Penis: Uncircumcised. No phimosis, paraphimosis, erythema, tenderness, discharge or swelling.       Testes: Normal.      Comments: Multiple skin colored raised tags noted to foreskin of penis.  Musculoskeletal:      Cervical back: Normal range of motion.   Skin:     General: Skin is warm and dry.   Neurological:      Mental Status: He is alert and oriented to person, place, and time.         Assessment:      Problem Noted   Penile Lesion 6/26/2024       Plan:     Referral placed for dermatology for possible removal  Advised patient to reach out to me if needed.  F/u PRN    KAVYA Toussaint    I spent a total of 20 minutes on the day of the visit.This includes face to face time and non-face to face time preparing to see the patient (eg, review of tests), obtaining and/or reviewing separately obtained history, documenting clinical information in the electronic or other health record, independently interpreting results and communicating results to the patient/family/caregiver, or care coordinator.

## 2024-06-28 ENCOUNTER — OFFICE VISIT (OUTPATIENT)
Dept: DERMATOLOGY | Facility: CLINIC | Age: 44
End: 2024-06-28
Payer: COMMERCIAL

## 2024-06-28 DIAGNOSIS — N48.9 PENILE LESION: ICD-10-CM

## 2024-06-28 DIAGNOSIS — A63.0 CONDYLOMA ACUMINATA: Primary | ICD-10-CM

## 2024-06-28 RX ORDER — IMIQUIMOD 12.5 MG/.25G
CREAM TOPICAL
Qty: 12 PACKET | Refills: 1 | Status: SHIPPED | OUTPATIENT
Start: 2024-06-28

## 2024-06-28 NOTE — PROGRESS NOTES
Subjective:      Patient ID:  Bassam Smith is a 44 y.o. male who presents for   Chief Complaint   Patient presents with    Spot     Penis     New Patient     Patient is coming in today with complaints of 3 skin lesion on penis. States they have been there for about 3 months. PCP referred him          Review of Systems   Constitutional:  Negative for fever, chills and fatigue.   Respiratory:  Negative for cough and shortness of breath.    Gastrointestinal:  Negative for nausea, vomiting and diarrhea.       Objective:   Physical Exam   Constitutional: He appears well-developed and well-nourished.   Genitourinary:         Neurological: He is alert and oriented to person, place, and time.   Psychiatric: He has a normal mood and affect.   Skin:   Areas Examined (abnormalities noted in diagram):   Genitals / Buttocks / Groin Inspection Performed       Diagram Legend     Erythematous scaling macule/papule c/w actinic keratosis       Vascular papule c/w angioma      Pigmented verrucoid papule/plaque c/w seborrheic keratosis      Yellow umbilicated papule c/w sebaceous hyperplasia      Irregularly shaped tan macule c/w lentigo     1-2 mm smooth white papules consistent with Milia      Movable subcutaneous cyst with punctum c/w epidermal inclusion cyst      Subcutaneous movable cyst c/w pilar cyst      Firm pink to brown papule c/w dermatofibroma      Pedunculated fleshy papule(s) c/w skin tag(s)      Evenly pigmented macule c/w junctional nevus     Mildly variegated pigmented, slightly irregular-bordered macule c/w mildly atypical nevus      Flesh colored to evenly pigmented papule c/w intradermal nevus       Pink pearly papule/plaque c/w basal cell carcinoma      Erythematous hyperkeratotic cursted plaque c/w SCC      Surgical scar with no sign of skin cancer recurrence      Open and closed comedones      Inflammatory papules and pustules      Verrucoid papule consistent consistent with wart     Erythematous eczematous  patches and plaques     Dystrophic onycholytic nail with subungual debris c/w onychomycosis     Umbilicated papule    Erythematous-base heme-crusted tan verrucoid plaque consistent with inflamed seborrheic keratosis     Erythematous Silvery Scaling Plaque c/w Psoriasis     See annotation      Assessment / Plan:        Condyloma acuminata  -     imiquimod (ALDARA) 5 % cream; Apply topically 3 (three) times a week.  Dispense: 12 packet; Refill: 1  Cryosurgery procedure note:    Verbal consent from the patient is obtained. Liquid nitrogen cryosurgery is applied to 8 lesions to produce a freeze injury. The patient is aware that blisters may form and is instructed on wound care with gentle cleansing and use of vaseline ointment to keep moist until healed. The patient is supplied a handout on cryosurgery and is instructed to call if lesions do not completely resolve.  Discussed risks and benefits of aldara  Counseled on HPV             No follow-ups on file.

## 2024-07-30 ENCOUNTER — OFFICE VISIT (OUTPATIENT)
Dept: DERMATOLOGY | Facility: CLINIC | Age: 44
End: 2024-07-30
Payer: COMMERCIAL

## 2024-07-30 DIAGNOSIS — A63.0 CONDYLOMA ACUMINATA: Primary | ICD-10-CM

## 2024-07-30 PROCEDURE — 54056 CRYOSURGERY PENIS LESION(S): CPT | Mod: S$GLB,,, | Performed by: STUDENT IN AN ORGANIZED HEALTH CARE EDUCATION/TRAINING PROGRAM

## 2024-07-30 PROCEDURE — 99499 UNLISTED E&M SERVICE: CPT | Mod: S$GLB,,, | Performed by: STUDENT IN AN ORGANIZED HEALTH CARE EDUCATION/TRAINING PROGRAM

## 2024-07-30 RX ORDER — IMIQUIMOD 12.5 MG/.25G
CREAM TOPICAL
Qty: 12 PACKET | Refills: 1 | Status: SHIPPED | OUTPATIENT
Start: 2024-07-31

## 2024-07-30 NOTE — PATIENT INSTRUCTIONS

## 2024-07-30 NOTE — PROGRESS NOTES
Subjective:      Patient ID:  Bassam Smith is a 44 y.o. male who presents for   Chief Complaint   Patient presents with    Follow-up     4 week follow up     LOV 06/28/2024    Patient is coming in today for a 4 week follow up for lesions on his penis. Patient states that the lesion are now gone. Patient applied aldara three times a week.       Condyloma acuminata  -     imiquimod (ALDARA) 5 % cream; Apply topically 3 (three) times a week.  Dispense: 12 packet; Refill: 1          Review of Systems   Constitutional:  Negative for fever, chills and fatigue.   Respiratory:  Negative for cough and shortness of breath.    Gastrointestinal:  Negative for nausea, vomiting and diarrhea.       Objective:   Physical Exam   Constitutional: He appears well-developed and well-nourished.   Genitourinary:         Neurological: He is alert and oriented to person, place, and time.   Psychiatric: He has a normal mood and affect.   Skin:   Areas Examined (abnormalities noted in diagram):   Genitals / Buttocks / Groin Inspection Performed       Diagram Legend     Erythematous scaling macule/papule c/w actinic keratosis       Vascular papule c/w angioma      Pigmented verrucoid papule/plaque c/w seborrheic keratosis      Yellow umbilicated papule c/w sebaceous hyperplasia      Irregularly shaped tan macule c/w lentigo     1-2 mm smooth white papules consistent with Milia      Movable subcutaneous cyst with punctum c/w epidermal inclusion cyst      Subcutaneous movable cyst c/w pilar cyst      Firm pink to brown papule c/w dermatofibroma      Pedunculated fleshy papule(s) c/w skin tag(s)      Evenly pigmented macule c/w junctional nevus     Mildly variegated pigmented, slightly irregular-bordered macule c/w mildly atypical nevus      Flesh colored to evenly pigmented papule c/w intradermal nevus       Pink pearly papule/plaque c/w basal cell carcinoma      Erythematous hyperkeratotic cursted plaque c/w SCC      Surgical scar with no  sign of skin cancer recurrence      Open and closed comedones      Inflammatory papules and pustules      Verrucoid papule consistent consistent with wart     Erythematous eczematous patches and plaques     Dystrophic onycholytic nail with subungual debris c/w onychomycosis     Umbilicated papule    Erythematous-base heme-crusted tan verrucoid plaque consistent with inflamed seborrheic keratosis     Erythematous Silvery Scaling Plaque c/w Psoriasis     See annotation      Assessment / Plan:        Condyloma acuminata  -     imiquimod (ALDARA) 5 % cream; Apply topically 3 (three) times a week.  Dispense: 12 packet; Refill: 1  Cryosurgery procedure note:    Verbal consent from the patient is obtained. Liquid nitrogen cryosurgery is applied to 16 condyloma, as detailed in the physical exam, to produce a freeze injury. 2 consecutive freeze thaw cycles are applied to each condyloma. The patient is aware that blisters (possibly blood blisters) may form.             No follow-ups on file.

## 2024-08-27 ENCOUNTER — TELEPHONE (OUTPATIENT)
Dept: DERMATOLOGY | Facility: CLINIC | Age: 44
End: 2024-08-27

## 2024-08-27 NOTE — TELEPHONE ENCOUNTER
----- Message from Emilee Ram sent at 8/26/2024  6:59 PM CDT -----  Type:  Sooner Apoointment Request    Caller is requesting a sooner appointment.  Caller declined first available appointment listed below.  Caller will not accept being placed on the waitlist and is requesting a message be sent to doctor.  Name of Caller: Pt  When is the first available appointment?  Symptoms: office reschedule appt  Would the patient rather a call back or a response via [facility]? Call  Best Call Back Number:  [phone]  Additional Information: Pt states he was contacted to reschedule appt due to provider not feeling well.

## 2024-09-04 ENCOUNTER — OFFICE VISIT (OUTPATIENT)
Dept: DERMATOLOGY | Facility: CLINIC | Age: 44
End: 2024-09-04
Payer: COMMERCIAL

## 2024-09-04 DIAGNOSIS — A63.0 CONDYLOMA ACUMINATA: ICD-10-CM

## 2024-09-04 PROCEDURE — 99499 UNLISTED E&M SERVICE: CPT | Mod: S$GLB,,, | Performed by: STUDENT IN AN ORGANIZED HEALTH CARE EDUCATION/TRAINING PROGRAM

## 2024-09-04 PROCEDURE — 54056 CRYOSURGERY PENIS LESION(S): CPT | Mod: S$GLB,,, | Performed by: STUDENT IN AN ORGANIZED HEALTH CARE EDUCATION/TRAINING PROGRAM

## 2024-09-04 RX ORDER — IMIQUIMOD 12.5 MG/.25G
CREAM TOPICAL
Qty: 12 PACKET | Refills: 1 | Status: SHIPPED | OUTPATIENT
Start: 2024-09-04

## 2024-09-04 NOTE — PROGRESS NOTES
Subjective:      Patient ID:  Bassam Smith is a 44 y.o. male who presents for   Chief Complaint   Patient presents with    Follow-up     4 week follow up     LOV 07/30/2024    Patient is coming in today for a 4 week follow up for lesions on his penis. Patient states that the lesion are now gone. States that he area has turned white and it is sore.        Patient is using imiquimod (ALDARA) 5 % cream; Apply topically 3 (three) times a week.          Review of Systems   Constitutional:  Negative for fever, chills and fatigue.   Respiratory:  Negative for cough and shortness of breath.    Gastrointestinal:  Negative for nausea, vomiting and diarrhea.       Objective:   Physical Exam   Constitutional: He appears well-developed and well-nourished.   Genitourinary:         Neurological: He is alert and oriented to person, place, and time.   Psychiatric: He has a normal mood and affect.   Skin:   Areas Examined (abnormalities noted in diagram):   Genitals / Buttocks / Groin Inspection Performed       Diagram Legend     Erythematous scaling macule/papule c/w actinic keratosis       Vascular papule c/w angioma      Pigmented verrucoid papule/plaque c/w seborrheic keratosis      Yellow umbilicated papule c/w sebaceous hyperplasia      Irregularly shaped tan macule c/w lentigo     1-2 mm smooth white papules consistent with Milia      Movable subcutaneous cyst with punctum c/w epidermal inclusion cyst      Subcutaneous movable cyst c/w pilar cyst      Firm pink to brown papule c/w dermatofibroma      Pedunculated fleshy papule(s) c/w skin tag(s)      Evenly pigmented macule c/w junctional nevus     Mildly variegated pigmented, slightly irregular-bordered macule c/w mildly atypical nevus      Flesh colored to evenly pigmented papule c/w intradermal nevus       Pink pearly papule/plaque c/w basal cell carcinoma      Erythematous hyperkeratotic cursted plaque c/w SCC      Surgical scar with no sign of skin cancer recurrence       Open and closed comedones      Inflammatory papules and pustules      Verrucoid papule consistent consistent with wart     Erythematous eczematous patches and plaques     Dystrophic onycholytic nail with subungual debris c/w onychomycosis     Umbilicated papule    Erythematous-base heme-crusted tan verrucoid plaque consistent with inflamed seborrheic keratosis     Erythematous Silvery Scaling Plaque c/w Psoriasis     See annotation      Assessment / Plan:        Condyloma acuminata  -     imiquimod (ALDARA) 5 % cream; Apply topically 3 (three) times a week.  Dispense: 12 packet; Refill: 1  Cryosurgery procedure note:    Verbal consent from the patient is obtained. Liquid nitrogen cryosurgery is applied to 6 lesions to produce a freeze injury x 2 freeze thaw cycles. The patient is aware that blisters may form and is instructed on wound care with gentle cleansing and use of vaseline ointment to keep moist until healed. The patient is supplied a handout on cryosurgery and is instructed to call if lesions do not completely resolve.           No follow-ups on file.

## 2024-09-04 NOTE — PATIENT INSTRUCTIONS

## 2024-10-02 ENCOUNTER — TELEPHONE (OUTPATIENT)
Dept: DERMATOLOGY | Facility: CLINIC | Age: 44
End: 2024-10-02
Payer: COMMERCIAL

## 2024-10-02 NOTE — TELEPHONE ENCOUNTER
----- Message from Marie sent at 10/2/2024  9:25 AM CDT -----  Regarding: Sooner Appointment Reschedule Request  Contact: patient at [phone]  Type:  Sooner Appointment Reschedule Request    Name of Caller:  patient at [phone]    Additional Information:  Patient has a cardio appointment at the same time and would like to see if he could be seen later in the day or another day soon after. Please call and advise. Thank you